# Patient Record
Sex: FEMALE | Race: WHITE | NOT HISPANIC OR LATINO | Employment: FULL TIME | URBAN - METROPOLITAN AREA
[De-identification: names, ages, dates, MRNs, and addresses within clinical notes are randomized per-mention and may not be internally consistent; named-entity substitution may affect disease eponyms.]

---

## 2024-10-29 DIAGNOSIS — Z30.09 COUNSELING FOR BIRTH CONTROL, ORAL CONTRACEPTIVES: ICD-10-CM

## 2024-10-29 DIAGNOSIS — R45.86 EMOTIONAL LABILITY: ICD-10-CM

## 2024-10-29 RX ORDER — DROSPIRENONE AND ETHINYL ESTRADIOL 0.02-3(28)
1 KIT ORAL DAILY
Qty: 84 TABLET | Refills: 0 | Status: SHIPPED | OUTPATIENT
Start: 2024-10-29

## 2024-12-19 ENCOUNTER — OFFICE VISIT (OUTPATIENT)
Dept: OBSTETRICS AND GYNECOLOGY | Facility: CLINIC | Age: 49
End: 2024-12-19
Attending: OBSTETRICS & GYNECOLOGY
Payer: COMMERCIAL

## 2024-12-19 VITALS
HEIGHT: 62 IN | WEIGHT: 133.38 LBS | BODY MASS INDEX: 24.54 KG/M2 | SYSTOLIC BLOOD PRESSURE: 90 MMHG | DIASTOLIC BLOOD PRESSURE: 50 MMHG

## 2024-12-19 DIAGNOSIS — Z12.31 ENCOUNTER FOR SCREENING MAMMOGRAM FOR MALIGNANT NEOPLASM OF BREAST: ICD-10-CM

## 2024-12-19 DIAGNOSIS — Z30.09 COUNSELING FOR BIRTH CONTROL, ORAL CONTRACEPTIVES: ICD-10-CM

## 2024-12-19 DIAGNOSIS — R45.86 EMOTIONAL LABILITY: ICD-10-CM

## 2024-12-19 DIAGNOSIS — Z01.419 WELL FEMALE EXAM WITH ROUTINE GYNECOLOGICAL EXAM: Primary | ICD-10-CM

## 2024-12-19 PROCEDURE — 99999 PR PBB SHADOW E&M-EST. PATIENT-LVL III: CPT | Mod: PBBFAC,,, | Performed by: OBSTETRICS & GYNECOLOGY

## 2024-12-19 RX ORDER — DROSPIRENONE AND ETHINYL ESTRADIOL 0.02-3(28)
1 KIT ORAL DAILY
Qty: 84 TABLET | Refills: 3 | Status: SHIPPED | OUTPATIENT
Start: 2024-12-19 | End: 2025-12-19

## 2024-12-19 NOTE — PROGRESS NOTES
SUBJECTIVE:   49 y.o. female   for routine gyn exam. Patient's last menstrual period was 12/10/2024 (approximate)..  She has no unusual complaints . Desires refill of OCP's       Past Medical History:   Diagnosis Date    Attention deficit disorder (ADD)      Past Surgical History:   Procedure Laterality Date     SECTION       and     HIP REPLACEMENT ARTHROPLASTY Right 2022    REMOVAL OF BREAST IMPLANT Bilateral 2019    tummy tuck  2019     Social History     Socioeconomic History    Marital status:    Tobacco Use    Smoking status: Former     Current packs/day: 0.50     Average packs/day: 0.5 packs/day for 20.0 years (10.0 ttl pk-yrs)     Types: Cigarettes     Passive exposure: Past    Smokeless tobacco: Never   Substance and Sexual Activity    Alcohol use: Yes     Comment: socially    Drug use: Yes     Types: Marijuana     Comment: gummies    Sexual activity: Yes     Partners: Male     Birth control/protection: OCP     Comment: single      Family History   Problem Relation Name Age of Onset    Hypertension Father      Hypertension Sister      Cancer Paternal Grandmother      Cancer Paternal Grandfather      Breast cancer Neg Hx      Colon cancer Neg Hx      Ovarian cancer Neg Hx       OB History    Para Term  AB Living   2 2 2     2   SAB IAB Ectopic Multiple Live Births           2      # Outcome Date GA Lbr Andrea/2nd Weight Sex Type Anes PTL Lv   2 Term 03    F CS-LTranv   DIONNE   1 Term 96    F CS-LTranv   DIONNE         Current Outpatient Medications   Medication Sig Dispense Refill    ALPRAZolam (XANAX) 1 MG tablet Take 1 mg by mouth every 8 (eight) hours as needed.      NURTEC 75 mg odt TAKE 1 TABLET BY MOUTH EVERY DAY AS NEEDED FOR MIGRAINE      semaglutide (OZEMPIC) 1 mg/dose (2 mg/1.5 mL) PnIj Inject 0.2 mg into the skin every 7 days.      temazepam (RESTORIL) 30 mg capsule Take 1 capsule (30 mg total) by mouth nightly as needed for  "Insomnia. 30 capsule 0    testosterone 87.5 mg pellet Inject 87.5 mg into the muscle every 4 (four) months. Bio-T      drospirenone-ethinyl estradioL (LORYNA, 28,) 3-0.02 mg per tablet Take 1 tablet by mouth once daily. 84 tablet 3     No current facility-administered medications for this visit.     Allergies: Vicodin [hydrocodone-acetaminophen]     ROS:  Constitutional: no weight loss, weight gain, fever, fatigue  Eyes:  No vision changes, glasses/contacts  ENT/Mouth: No ulcers, sinus problems, ears ringing, headache  Cardiovascular: No inability to lie flat, chest pain, exercise intolerance, swelling, heart palpitations  Respiratory: No wheezing, coughing blood, shortness of breath, or cough  Gastrointestinal: No diarrhea, bloody stool, nausea/vomiting, constipation, gas, hemorrhoids  Genitourinary: No blood in urine, painful urination, urgency of urination, frequency of urination, incomplete emptying, incontinence, abnormal bleeding, painful periods, heavy periods, vaginal discharge, vaginal odor, painful intercourse, sexual problems, bleeding after intercourse.  Musculoskeletal: No muscle weakness  Skin/Breast: No painful breasts, nipple discharge, masses, rash, ulcers  Neurological: No passing out, seizures, numbness, headache  Endocrine: No diabetes, hypothyroid, hyperthyroid, hot flashes, hair loss, abnormal hair growth, acne  Psychiatric: No depression, crying  Hematologic: No bruises, bleeding, swollen lymph nodes, anemia.      OBJECTIVE:   The patient appears well, alert, oriented x 3, in no distress.  BP (!) 90/50 (BP Location: Left arm, Patient Position: Sitting)   Ht 5' 2" (1.575 m)   Wt 60.5 kg (133 lb 6.1 oz)   LMP 12/10/2024 (Approximate)   BMI 24.40 kg/m²   NECK: no thyromegaly, trachea midline  SKIN: no acne, striae, hirsutism  CHEST: CTAB  CV: RRR  BREAST EXAM: breasts appear normal, no suspicious masses, no skin or nipple changes or axillary nodes  ABDOMEN: no hernias, masses, or " hepatosplenomegaly  GENITALIA: normal external genitalia, no erythema, no discharge  URETHRA: normal urethra, normal urethral meatus  VAGINA: Normal  CERVIX: no lesions or cervical motion tenderness  UTERUS: normal size, contour, position, consistency, mobility, non-tender  ADNEXA: no mass, fullness, tenderness      ASSESSMENT:   1. Well female exam with routine gynecological exam        2. Encounter for screening mammogram for malignant neoplasm of breast  Mammo Digital Screening Bilat w/ Bryon      3. Counseling for birth control, oral contraceptives  drospirenone-ethinyl estradioL (LORYNA, 28,) 3-0.02 mg per tablet      4. Emotional lability  drospirenone-ethinyl estradioL (LORYNA, 28,) 3-0.02 mg per tablet          PLAN:   Orders Placed This Encounter    Mammo Digital Screening Bilat w/ Bryon    drospirenone-ethinyl estradioL (LORYNA, 28,) 3-0.02 mg per tablet     Discussed OCP efficacy with Ozempic  Discussed healthy lifestyle including regular exercise, healthy eating, etc.  Return to clinic in 1 year

## 2025-02-03 DIAGNOSIS — Z30.09 COUNSELING FOR BIRTH CONTROL, ORAL CONTRACEPTIVES: ICD-10-CM

## 2025-02-03 DIAGNOSIS — R45.86 EMOTIONAL LABILITY: ICD-10-CM

## 2025-02-03 RX ORDER — DROSPIRENONE AND ETHINYL ESTRADIOL 0.02-3(28)
1 KIT ORAL DAILY
Qty: 84 TABLET | Refills: 3 | Status: SHIPPED | OUTPATIENT
Start: 2025-02-03 | End: 2025-02-04 | Stop reason: SDUPTHER

## 2025-02-03 NOTE — TELEPHONE ENCOUNTER
----- Message from Leticia sent at 2/3/2025  4:24 PM CST -----  Regarding: refill  Type:  RX Refill Request    Who Called: Yamini  Refill or New Rx:refill  RX Name and Strength:drospirenone-ethinyl estradioL (LORYNA, 28,) 3-0.02 mg per tablet  How is the patient currently taking it? (ex. 1XDay):  Is this a 30 day or 90 day RX:  Preferred Pharmacy with phone number:[id number] - [place], [place] - [address] AT [facility] & [address] [[id number]]  Local or Mail Order:local  Ordering Provider:  Would the patient rather a call back or a response via [facility]? call  Best Call Back Number: [phone]  Additional Information:

## 2025-02-04 DIAGNOSIS — R45.86 EMOTIONAL LABILITY: ICD-10-CM

## 2025-02-04 DIAGNOSIS — Z30.09 COUNSELING FOR BIRTH CONTROL, ORAL CONTRACEPTIVES: ICD-10-CM

## 2025-02-04 RX ORDER — DROSPIRENONE AND ETHINYL ESTRADIOL 0.02-3(28)
1 KIT ORAL DAILY
Qty: 84 TABLET | Refills: 3 | Status: SHIPPED | OUTPATIENT
Start: 2025-02-04 | End: 2026-02-04

## 2025-02-04 NOTE — TELEPHONE ENCOUNTER
----- Message from Marleny sent at 2/4/2025  4:41 PM CST -----  Regarding: pharmacy change  Name of Who is Calling:  pt        What is the request in detail:pt is asking if y ou can please change the pharmacy for her rx:   drospirenone-ethinyl estradioL (LORYNA, 28,) 3-0.02 mg per tablet     Please call rx into:   [facility] [id number] [facility] - [place], [place]  [address] [facility]  Phone: [phone]  Fax: [phone]        What [facility] Back [facility]: [phone]